# Patient Record
Sex: FEMALE | Race: WHITE | NOT HISPANIC OR LATINO | URBAN - METROPOLITAN AREA
[De-identification: names, ages, dates, MRNs, and addresses within clinical notes are randomized per-mention and may not be internally consistent; named-entity substitution may affect disease eponyms.]

---

## 2017-07-05 ENCOUNTER — OFFICE (OUTPATIENT)
Dept: URBAN - METROPOLITAN AREA CLINIC 78 | Facility: CLINIC | Age: 72
End: 2017-07-05
Payer: COMMERCIAL

## 2017-07-05 VITALS
TEMPERATURE: 97.9 F | SYSTOLIC BLOOD PRESSURE: 97 MMHG | DIASTOLIC BLOOD PRESSURE: 61 MMHG | HEIGHT: 61 IN | WEIGHT: 129 LBS | HEART RATE: 89 BPM

## 2017-07-05 DIAGNOSIS — R13.10 DYSPHAGIA, UNSPECIFIED: ICD-10-CM

## 2017-07-05 DIAGNOSIS — C16.0 MALIGNANT NEOPLASM OF CARDIA: ICD-10-CM

## 2017-07-05 PROCEDURE — 99213 OFFICE O/P EST LOW 20 MIN: CPT

## 2017-07-05 NOTE — SERVICEHPINOTES
70 yo female with metastatic gastroesophageal cancer presents to discuss possible EGD/dilation. She was originially treated with chemo/XRT in 2012 and had been doing well with no signs of disease underwent EGD 2/15 that revealed recurrence and subsequently underwent surgical resection 3/15. Last EGD/dilation was in September 2016. Non-dysplastic Walsh's tissue noted with 2-year recall advised. Starting in December 2016 she again took a course of radiation and chemo - last chemo was early this week so now she has a break and will have a CT on the 14th. Continues to follow with Dr. Spicer who advised her to consider a repeat balloon dilation as she does have difficulty swallowing meats. No pain when she swallows. She is careful when she eats, has to eat very slowly but she is not really feeling a need for EGD/dilation at this time.